# Patient Record
Sex: FEMALE | Race: OTHER
[De-identification: names, ages, dates, MRNs, and addresses within clinical notes are randomized per-mention and may not be internally consistent; named-entity substitution may affect disease eponyms.]

---

## 2020-02-14 ENCOUNTER — HOSPITAL ENCOUNTER (EMERGENCY)
Dept: HOSPITAL 54 - ER | Age: 25
Discharge: HOME | End: 2020-02-14
Payer: COMMERCIAL

## 2020-02-14 VITALS — WEIGHT: 122 LBS | HEIGHT: 61 IN | BODY MASS INDEX: 23.03 KG/M2

## 2020-02-14 VITALS — DIASTOLIC BLOOD PRESSURE: 78 MMHG | SYSTOLIC BLOOD PRESSURE: 130 MMHG
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
## 2020-02-14 DIAGNOSIS — K58.9: ICD-10-CM
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
## 2020-02-14 DIAGNOSIS — S16.1XXA: Primary | ICD-10-CM

## 2020-02-14 DIAGNOSIS — S39.012A: ICD-10-CM
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
